# Patient Record
(demographics unavailable — no encounter records)

---

## 2019-02-06 NOTE — NUR
EKG WAS COMPLETED IN TRIAGE AND PRESENTED TO [facility].

## 2019-02-06 NOTE — NUR
PT. DENIES NEEDS AT THIS TIME.  REPORTS PALPITAIONS "ARE LESS OFTEN".  NADN.  
WIFE REMAINS AT BS FOR SUPPORT.

## 2019-02-06 NOTE — NUR
RECEIVED REPORT FROM PRINCESS YA. PT RESTING ON MELIZA. VALENTIN GOODRICHS. FAMILY REMAINS 
AT BEDSIDE.

## 2019-02-06 NOTE — NUR
PT. PLACED ON CONTINUOUS PULSE OX, B/P, AND HEART MONITORS.  DR. LEMONS AT  
TO EVAL PT. AND DISCUSS POC WITH PT. AND WIFE.  CALL LIGHT IN REACH.  ALL 
SAFETY MEASURES OBSERVED.  AWITING ORDERS.